# Patient Record
Sex: FEMALE | Race: WHITE | NOT HISPANIC OR LATINO | Employment: FULL TIME | URBAN - NONMETROPOLITAN AREA
[De-identification: names, ages, dates, MRNs, and addresses within clinical notes are randomized per-mention and may not be internally consistent; named-entity substitution may affect disease eponyms.]

---

## 2021-05-15 ENCOUNTER — HOSPITAL ENCOUNTER (EMERGENCY)
Facility: HOSPITAL | Age: 41
Discharge: HOME | End: 2021-05-15
Admitting: EMERGENCY MEDICINE

## 2021-05-15 ENCOUNTER — APPOINTMENT (OUTPATIENT)
Dept: CT IMAGING | Facility: HOSPITAL | Age: 41
End: 2021-05-15

## 2021-05-15 VITALS
WEIGHT: 234 LBS | OXYGEN SATURATION: 100 % | HEIGHT: 65 IN | DIASTOLIC BLOOD PRESSURE: 91 MMHG | TEMPERATURE: 98.7 F | RESPIRATION RATE: 17 BRPM | HEART RATE: 87 BPM | SYSTOLIC BLOOD PRESSURE: 143 MMHG | BODY MASS INDEX: 38.99 KG/M2

## 2021-05-15 DIAGNOSIS — S00.33XA CONTUSION OF NOSE, INITIAL ENCOUNTER: ICD-10-CM

## 2021-05-15 DIAGNOSIS — S09.93XA FACIAL INJURY, INITIAL ENCOUNTER: Primary | ICD-10-CM

## 2021-05-15 LAB
B-HCG UR QL: NEGATIVE
INTERNAL NEGATIVE CONTROL: NEGATIVE
INTERNAL POSITIVE CONTROL: POSITIVE
Lab: NORMAL

## 2021-05-15 PROCEDURE — 70450 CT HEAD/BRAIN W/O DYE: CPT

## 2021-05-15 PROCEDURE — 99283 EMERGENCY DEPT VISIT LOW MDM: CPT

## 2021-05-15 PROCEDURE — 70486 CT MAXILLOFACIAL W/O DYE: CPT

## 2021-05-15 PROCEDURE — 72125 CT NECK SPINE W/O DYE: CPT

## 2021-05-15 PROCEDURE — 81025 URINE PREGNANCY TEST: CPT | Performed by: NURSE PRACTITIONER

## 2021-05-15 RX ORDER — ACETAMINOPHEN 500 MG
1000 TABLET ORAL ONCE
Status: COMPLETED | OUTPATIENT
Start: 2021-05-15 | End: 2021-05-15

## 2021-05-15 RX ADMIN — ACETAMINOPHEN 1000 MG: 500 TABLET, FILM COATED ORAL at 17:07

## 2021-05-15 NOTE — ED NOTES
Called CT to check on time for pt. To come to CT.... JUWAN Jim notified ED (JERRY Rodríguez) AT 14:04 that pt. Was ready     Kaitlin Marie, RN  05/15/21 7404

## 2021-05-16 NOTE — ED PROVIDER NOTES
Subjective   Patient is a 41-year-old female that presents ER today with complaint of facial injury.  The patient reports that she was playing with her 6-year-old daughter and her daughter jumped up and hit her in her nose.  States that since then she has had pain in her nose she may have broke her nose.  She other injuries.  She had no LOC.  She is on anticoagulants.  States that she had a small nosebleed for several minutes but it resolved.  She presents here today for further evaluation.      History provided by:  Patient   used: No    Facial Injury  Mechanism of injury:  Direct blow  Location:  Nose  Time since incident:  1 hour  Pain details:     Quality:  Aching and dull    Severity:  Moderate    Duration:  1 hour    Timing:  Constant    Progression:  Unchanged  Relieved by:  Nothing  Worsened by:  Nothing  Ineffective treatments:  None tried  Associated symptoms: epistaxis    Associated symptoms: no altered mental status, no congestion, no difficulty breathing, no double vision, no ear pain, no headaches, no loss of consciousness, no malocclusion, no nausea, no neck pain, no rhinorrhea, no trismus, no vomiting and no wheezing    Risk factors: no alcohol use, no bone disorder, no concern for non-accidental trauma, no frequent falls and no prior injuries to these areas        Review of Systems   HENT: Positive for nosebleeds. Negative for congestion, ear pain and rhinorrhea.    Eyes: Negative for double vision.   Respiratory: Negative for wheezing.    Gastrointestinal: Negative for nausea and vomiting.   Musculoskeletal: Negative for neck pain.   Neurological: Negative for loss of consciousness and headaches.   All other systems reviewed and are negative.      History reviewed. No pertinent past medical history.    No Known Allergies    History reviewed. No pertinent surgical history.    History reviewed. No pertinent family history.    Social History     Socioeconomic History   • Marital  status:      Spouse name: Not on file   • Number of children: Not on file   • Years of education: Not on file   • Highest education level: Not on file           Objective   Physical Exam  Vitals and nursing note reviewed.   Constitutional:       Appearance: Normal appearance.   HENT:      Head: Normocephalic.        Mouth/Throat:      Pharynx: Oropharynx is clear.   Eyes:      Conjunctiva/sclera: Conjunctivae normal.      Pupils: Pupils are equal, round, and reactive to light.   Neck:      Comments: To move all extremities, neurologically intact, no laxity or step-off noted.  Cardiovascular:      Rate and Rhythm: Normal rate and regular rhythm.   Pulmonary:      Effort: Pulmonary effort is normal.      Breath sounds: Normal breath sounds.   Musculoskeletal:      Cervical back: Normal range of motion and neck supple.   Skin:     General: Skin is warm and dry.      Capillary Refill: Capillary refill takes less than 2 seconds.   Neurological:      General: No focal deficit present.      Mental Status: She is alert.   Psychiatric:         Mood and Affect: Mood normal.         Procedures           ED Course  ED Course as of May 16 1136   Sat May 15, 2021   1546 After initially seen the patient the patient reported that she was having intermittent blurred vision in the right eye that and she felt that it was difficult for her to focus with her right eye.  She also reported dizziness, headache, and was having some neck pain.  I have gone ahead and ordered a CT head and neck for the patient after re-evaluating the pt.     [LF]   Sun May 16, 2021   1136 The patient's CTs were all negative for acute findings.  She be discharged home at this time in stable condition.  Of advised her to use ice to the affected area for 10 to 20 minutes 4 times daily.  She is vies she may take Tylenol or Motrin as needed for pain control.  She be discharged home at this time in stable condition with return the ER for any new or worsening  symptoms.  Advised follow primary care provider on Monday.    [LF]      ED Course User Index  [LF] Angela Gonzalez, APRBRIDGER                                   CT Head Without Contrast   Final Result   1. No acute intracranial process.           This report was finalized on 05/15/2021 16:47 by Dr. Terell Rodríguez MD.      CT Cervical Spine Without Contrast   Final Result   1. Degenerative change at C5-6 level with no acute osseous injury       This report was finalized on 05/15/2021 16:50 by Dr. Terell Rodríguez MD.      CT Facial Bones Without Contrast   Final Result   1. No acute facial trauma is demonstrated. Mild mucosal thickening is   present floor of the left maxillary antrum.           This report was finalized on 05/15/2021 15:28 by Dr. Terell Rodríguez MD.        Labs Reviewed   POCT PEFORM URINE PREGNANCY - Normal             MDM  Number of Diagnoses or Management Options  Contusion of nose, initial encounter: new and requires workup  Facial injury, initial encounter: new and requires workup     Amount and/or Complexity of Data Reviewed  Clinical lab tests: ordered and reviewed  Tests in the radiology section of CPT®: reviewed and ordered    Patient Progress  Patient progress: stable      Final diagnoses:   Facial injury, initial encounter   Contusion of nose, initial encounter       ED Disposition  ED Disposition     ED Disposition Condition Comment    Discharge Stable           Provider, No Known  [facility] [id number]  [phone]    Call in 1 day           Medication List      No changes were made to your prescriptions during this visit.          Angela Gonzalez, JASON  05/16/21 1136